# Patient Record
(demographics unavailable — no encounter records)

---

## 2024-10-30 NOTE — HISTORY OF PRESENT ILLNESS
[FreeTextEntry1] : 17 yr check up  senior in HS.  interested in dentistry.  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  has not followed with lipid clinic in a couple of years.  eating better.  little exercise.  no meds no acute concerns. feels safe denies at risk behaviors not sexually active

## 2024-10-30 NOTE — PHYSICAL EXAM

## 2024-10-30 NOTE — DISCUSSION/SUMMARY
[FreeTextEntry1] : Healthy 17 yr old Doing well. Obesity - decreased weight gain velocity.  dietary discussion.  encouraged reengagement with lipid center, h/o familial dyslipidemia.  Screening blood work today. routine care annual exam  S Plasty Text: Given the location and shape of the defect, and the orientation of relaxed skin tension lines, an S-plasty was deemed most appropriate for repair.  Using a sterile surgical marker, the appropriate outline of the S-plasty was drawn, incorporating the defect and placing the expected incisions within the relaxed skin tension lines where possible.  The area thus outlined was incised deep to adipose tissue with a #15 scalpel blade.  The skin margins were undermined to an appropriate distance in all directions utilizing iris scissors. The skin flaps were advanced over the defect.  The opposing margins were then approximated with interrupted buried subcutaneous sutures.

## 2025-03-07 NOTE — CONSULT LETTER
[Dear  ___] : Dear  [unfilled], [Consult Letter:] : I had the pleasure of evaluating your patient, [unfilled]. [Please see my note below.] : Please see my note below. [Consult Closing:] : Thank you very much for allowing me to participate in the care of this patient.  If you have any questions, please do not hesitate to contact me. [Sincerely,] : Sincerely, [FreeTextEntry2] : Donnell Kruger MD  [FreeTextEntry3] : Olivia Evans MD Facial Plastic & Reconstructive Surgery Department of Otolaryngology [address] [place] [address] [phone]

## 2025-03-07 NOTE — PROCEDURE
[FreeTextEntry1] : nasal endoscopy [FreeTextEntry2] : right nasal congestion [FreeTextEntry3] : Procedure: nasal endoscopy   Pre-operative diagnosis:   Indication: Anterior rhinoscopy insufficient to diagnose pathology  Details: After decongestant and lidocaine was sprayed in the bilateral nasal cavities, a flexible laryngoscope was inserted into the right nares. The nasal cavity, middle meatus, ETO, nasopharynx, and glottis were visualized. The endoscope was then inserted into the left nares and the nasal cavity, middle meatus, and ETO was visualized. The patient tolerated procedure well.  Findings: right middle meatus edema nd purulent drainage noted

## 2025-03-07 NOTE — HISTORY OF PRESENT ILLNESS
[de-identified] : 17 year old male presents for evaluation of clogged ears and nasal congestion. Reports bilateral clogged ears intermittently x several months. States hearing comes in and out depending on positioning. Relief noted in the past after cerumen removal. Currently with acute nasal congestion and nasal drainage since Thursday. Patient states he assessed inside his nostrils - notes something swollen bilaterally. Concerned for nasal polyps. No previous scope exam. No hx of allergy testing. Denies nasal congestion, rhinorrhea and sinus pressure at baseline.  No current use of nasal sprays.

## 2025-03-07 NOTE — CONSULT LETTER
[Dear  ___] : Dear  [unfilled], [Consult Letter:] : I had the pleasure of evaluating your patient, [unfilled]. [Please see my note below.] : Please see my note below. [Consult Closing:] : Thank you very much for allowing me to participate in the care of this patient.  If you have any questions, please do not hesitate to contact me. [Sincerely,] : Sincerely, [FreeTextEntry2] : Donnell Kruger MD  [FreeTextEntry3] : Olivia Evans MD Facial Plastic & Reconstructive Surgery Department of Otolaryngology [address] [place] [address] [phone]

## 2025-03-07 NOTE — REASON FOR VISIT
[Subsequent Evaluation] : a subsequent evaluation for [Language Line ] : provided by Language [employer]   [Source: ______] : History obtained from [unfilled] [FreeTextEntry2] : clogged ears and nasal congestion [Interpreters_IDNumber] : [id number] [Interpreters_FullName] : Marilynn  [FreeTextEntry3] : Patient speaks English,  for Father  [TWNoteComboBox1] : American Sign Language

## 2025-03-07 NOTE — PHYSICAL EXAM
[Midline] : trachea located in midline position [Normal] : no rashes [de-identified] : cerumen removed tm intact [Nasal Endoscopy Performed] : nasal endoscopy was performed, see procedure section for findings

## 2025-03-07 NOTE — REASON FOR VISIT
[Subsequent Evaluation] : a subsequent evaluation for [Language Line ] : provided by [employer]   [Source: ______] : History obtained from [unfilled] [FreeTextEntry2] : clogged ears and nasal congestion [Interpreters_IDNumber] : [id number] [Interpreters_FullName] : Marilynn  [FreeTextEntry3] : Patient speaks English,  for Father  [TWNoteComboBox1] : American Sign Language

## 2025-03-07 NOTE — HISTORY OF PRESENT ILLNESS
[de-identified] : 17 year old male presents for evaluation of clogged ears and nasal congestion. Reports bilateral clogged ears intermittently x several months. States hearing comes in and out depending on positioning. Relief noted in the past after cerumen removal. Currently with acute nasal congestion and nasal drainage since Thursday. Patient states he assessed inside his nostrils - notes something swollen bilaterally. Concerned for nasal polyps. No previous scope exam. No hx of allergy testing. Denies nasal congestion, rhinorrhea and sinus pressure at baseline.  No current use of nasal sprays.

## 2025-03-07 NOTE — ASSESSMENT
[FreeTextEntry1] : 16 year old male with bilateral clogged ears, cerumen removed. we discussed abx for right max sinusitis.

## 2025-03-07 NOTE — PHYSICAL EXAM
[Midline] : trachea located in midline position [Normal] : no rashes [de-identified] : cerumen removed tm intact [Nasal Endoscopy Performed] : nasal endoscopy was performed, see procedure section for findings

## 2025-03-29 NOTE — BEGINNING OF VISIT
[] :  [Time Spent: ____ minutes] : Total time spent using  services: [unfilled] minutes. The patient's primary language is not English thus required  services. [Interpreters_IDNumber] : [id number] [Interpreters_FullName] : Pippa [TWNoteComboBox1] : American Sign Language

## 2025-03-29 NOTE — HISTORY OF PRESENT ILLNESS
[FreeTextEntry6] : Aureliano is a 17-year-old M coming in for acute visit for     Treated for sinusitis for 3/3 Augmentin BID x 7 days Took antibiotics, felt better Also tested positive for COVID the week prior.  On Tuesday, started to have symptoms Started with sore throat, everytime he swallowed was having ear pain and ear clogging sensation Nasal congestion started and started feeling dizzy Cough with phlegm buildup noted.  High fever in the AM and night. Fevers have been 101F Taking Tylenol for fever. Last fever last night.  No hx of allergies.  No one else sick at home right now.

## 2025-05-24 NOTE — HISTORY OF PRESENT ILLNESS
[FreeTextEntry6] : Right elizondo poain, particularly when jumping up from a squatting position and wors. Signifiant pain with squatting. At one point, hurt going up steps.  Back pain, started 1.5 weeks ago.  Friend tackled him from behind, but his neck hurts.  Was able to get out of bed.  Hurt to keep good posture.  Painful to bending to the side and twisting.   Went to 5 below and purchased someone to hyperextend his back.  Point tenderness on a particular area.  Hurts to twist, especially when twisint to the left.  Sometimes will have pain that radiates down his pain.   Taking nothing for his pain.  Goes to the school gym every day., mostly cardio.   After the injury, teacher said not to do anything.    [id number]

## 2025-05-24 NOTE — BEGINNING OF VISIT
[Patient] : patient [] :  [Language Line ] : provided by Language Line   [Father] : father [Interpreters_IDNumber] : [id number] [Interpreters_FullName] : Juani [TWNoteComboBox1] : American Sign Language

## 2025-05-24 NOTE — DISCUSSION/SUMMARY
[FreeTextEntry1] :  Assessment and Plan:  1. Acute Paraspinal Muscle Strain: Patient presents with acute back pain following a tackle injury. Physical examination reveals point tenderness in the paraspinal muscles without vertebral involvement, consistent with a muscle strain.     - Refer to physical therapy for evaluation and treatment      - Recommend ibuprofen for pain and inflammation, 2-3 tablets as needed, up to 3 times daily      - Apply heat to the affected area to relieve muscle spasms      - Advise to avoid aggravating activities and gradually return to normal activities as tolerated      - Follow up in one month if pain persists or worsens; consider MRI if no improvement   2. Patellofemoral Syndrome: Patient reports chronic bilateral knee pain, worse with deep squats and weight-bearing. Symptoms and examination findings are consistent with patellofemoral syndrome.     - Refer to physical therapy for evaluation and strengthening exercises      - Educate patient on the importance of balanced muscle strength around the knee      - Advise on proper warm-up before activities, especially in cold weather      - Recommend use of knee support or braces during high-impact activities if needed      - Follow up after completion of physical therapy to assess improvement

## 2025-05-24 NOTE — PHYSICAL EXAM
[de-identified] : Back examination: Point tenderness in the lower thoracic/upper lumbar region, more on the right side. No pain along the vertebrae. Knee examination: No pain on palpation of the kneecap or with direct pressure. Full range of motion in both knees without instability. Pain reported in the front of the knee during deep squats, particularly with added weight. [NL] : warm, clear

## 2025-05-24 NOTE — HISTORY OF PRESENT ILLNESS
[FreeTextEntry6] : Right elizondo poain, particularly when jumping up from a squatting position and wors. Signifiant pain with squatting. At one point, hurt going up steps.  Back pain, started 1.5 weeks ago.  Friend tackled him from behind, but his neck hurts.  Was able to get out of bed.  Hurt to keep good posture.  Painful to bending to the side and twisting.   Went to 5 below and purchased someone to hyperextend his back.  Point tenderness on a particular area.  Hurts to twist, especially when twisint to the left.  Sometimes will have pain that radiates down his pain.   Taking nothing for his pain.  Goes to the school gym every day., mostly cardio.   After the injury, teacher said not to do anything.    [id number]

## 2025-05-24 NOTE — PHYSICAL EXAM
[de-identified] : Back examination: Point tenderness in the lower thoracic/upper lumbar region, more on the right side. No pain along the vertebrae. Knee examination: No pain on palpation of the kneecap or with direct pressure. Full range of motion in both knees without instability. Pain reported in the front of the knee during deep squats, particularly with added weight. [NL] : warm, clear

## 2025-05-24 NOTE — BEGINNING OF VISIT
[Patient] : patient [] :  [Language Line ] : provided by Language Line   [Father] : father [Interpreters_IDNumber] : [id number] [Interpreters_FullName] : Juani [TWNoteComboBox1] : American Sign Language